# Patient Record
Sex: MALE | Race: BLACK OR AFRICAN AMERICAN | NOT HISPANIC OR LATINO | URBAN - METROPOLITAN AREA
[De-identification: names, ages, dates, MRNs, and addresses within clinical notes are randomized per-mention and may not be internally consistent; named-entity substitution may affect disease eponyms.]

---

## 2023-06-23 ENCOUNTER — HOSPITAL ENCOUNTER (EMERGENCY)
Facility: HOSPITAL | Age: 48
Discharge: HOME | End: 2023-06-23
Attending: EMERGENCY MEDICINE

## 2023-06-23 VITALS
TEMPERATURE: 98 F | OXYGEN SATURATION: 99 % | BODY MASS INDEX: 26.79 KG/M2 | WEIGHT: 220 LBS | RESPIRATION RATE: 18 BRPM | HEIGHT: 76 IN | HEART RATE: 74 BPM | SYSTOLIC BLOOD PRESSURE: 151 MMHG | DIASTOLIC BLOOD PRESSURE: 90 MMHG

## 2023-06-23 DIAGNOSIS — K08.89 PAIN, DENTAL: Primary | ICD-10-CM

## 2023-06-23 PROCEDURE — 99284 EMERGENCY DEPT VISIT MOD MDM: CPT

## 2023-06-23 RX ORDER — HYDROCODONE BITARTRATE AND ACETAMINOPHEN 5; 325 MG/1; MG/1
1 TABLET ORAL EVERY 6 HOURS PRN
Qty: 12 TABLET | Refills: 0 | Status: SHIPPED | OUTPATIENT
Start: 2023-06-23

## 2023-06-23 RX ORDER — AMOXICILLIN AND CLAVULANATE POTASSIUM 875; 125 MG/1; MG/1
1 TABLET, FILM COATED ORAL 2 TIMES DAILY
Qty: 20 TABLET | Refills: 0 | Status: SHIPPED | OUTPATIENT
Start: 2023-06-23 | End: 2023-07-03

## 2023-06-23 NOTE — ED PROVIDER NOTES
"Encounter Date: 6/23/2023       History     Chief Complaint   Patient presents with    Dental Pain     C/o right lower dental pain x2 days.      Patient states right lower dental pain starting last night. Denies any fever or drainage. States that his pain is in the area of a "cracked tooth."     The history is provided by the patient. No  was used.   Dental Pain  The primary symptoms include mouth pain. Primary symptoms do not include dental injury, oral bleeding, oral lesions, headaches, fever, sore throat or angioedema. The symptoms began yesterday. The symptoms are unchanged. The symptoms occur intermittently.   Mouth pain began 24 -48 hours ago. Mouth pain occurs intermittently. Mouth pain is unchanged. Affected locations include: teeth. At its highest the mouth pain was at 10/10. The mouth pain is currently at 10/10.   Additional symptoms do not include: dental sensitivity to temperature, purulent gums, trismus, jaw pain, facial swelling, trouble swallowing and pain with swallowing.   Review of patient's allergies indicates:  No Known Allergies  Past Medical History:   Diagnosis Date    Pain and swelling of left lower leg     Sciatica     Tobacco abuse      No past surgical history on file.  No family history on file.     Review of Systems   Constitutional: Negative.  Negative for chills and fever.   HENT:  Positive for dental problem. Negative for facial swelling, sore throat and trouble swallowing.    Eyes: Negative.    Respiratory: Negative.     Cardiovascular: Negative.    Gastrointestinal: Negative.    Endocrine: Negative.    Genitourinary: Negative.    Musculoskeletal: Negative.    Skin: Negative.    Allergic/Immunologic: Negative.    Neurological: Negative.  Negative for headaches.   Hematological: Negative.    Psychiatric/Behavioral: Negative.     All other systems reviewed and are negative.    Physical Exam     Initial Vitals [06/23/23 1107]   BP Pulse Resp Temp SpO2   (!) 151/90 74 " "18 98.4 °F (36.9 °C) 99 %      MAP       --         Physical Exam    Nursing note and vitals reviewed.  Constitutional: He appears well-developed and well-nourished. No distress.   HENT:   Head: Normocephalic and atraumatic.   Mouth/Throat: Uvula is midline, oropharynx is clear and moist and mucous membranes are normal. No trismus in the jaw. Abnormal dentition (Right lower molar appears broken with mild erythema at the gumline. There is no abscess or flunctuance noted.). No dental abscesses.       Eyes: Conjunctivae and EOM are normal. Pupils are equal, round, and reactive to light.   Neck: Neck supple.   Normal range of motion.  Cardiovascular:  Normal rate, regular rhythm, normal heart sounds and intact distal pulses.           Pulmonary/Chest: Breath sounds normal. No respiratory distress.   Musculoskeletal:         General: No edema. Normal range of motion.      Cervical back: Normal range of motion and neck supple.     Lymphadenopathy:     He has no cervical adenopathy.   Neurological: He is alert and oriented to person, place, and time. He has normal strength.   Skin: Skin is warm and dry. No rash noted.   Psychiatric: He has a normal mood and affect. Thought content normal.       ED Course   Procedures  Labs Reviewed - No data to display       Imaging Results    None          Medications - No data to display  Medical Decision Making:   Initial Assessment:   Patient states right lower dental pain starting last night. Denies any fever or drainage. States that his pain is in the area of a "cracked tooth."     The history is provided by the patient. No  was used.   Dental Pain  The primary symptoms include mouth pain. Primary symptoms do not include dental injury, oral bleeding, oral lesions, headaches, fever, sore throat or angioedema. The symptoms began yesterday. The symptoms are unchanged. The symptoms occur intermittently.   Mouth pain began 24 -48 hours ago. Mouth pain occurs " intermittently. Mouth pain is unchanged. Affected locations include: teeth. At its highest the mouth pain was at 10/10. The mouth pain is currently at 10/10.   Additional symptoms do not include: dental sensitivity to temperature, purulent gums, trismus, jaw pain, facial swelling, trouble swallowing and pain with swallowing.   Patient is awake,  alert, afebrile, and nontoxic appearing in the ED.  Differential Diagnosis:   Dental Pain, Dental Abscess  ED Management:  Will discharge with PO antibiotics and pain control. Instructed to follow-up with Dentist.                         Clinical Impression:   Final diagnoses:  [K08.89] Pain, dental (Primary)        ED Disposition Condition    Discharge Stable          ED Prescriptions       Medication Sig Dispense Start Date End Date Auth. Provider    HYDROcodone-acetaminophen (NORCO) 5-325 mg per tablet Take 1 tablet by mouth every 6 (six) hours as needed for Pain. 12 tablet 6/23/2023 -- CHEYANNE Gamboa    amoxicillin-clavulanate 875-125mg (AUGMENTIN) 875-125 mg per tablet Take 1 tablet by mouth 2 (two) times daily. for 10 days 20 tablet 6/23/2023 7/3/2023 CHEYANNE Gamboa          Follow-up Information       Follow up With Specialties Details Why Contact Info    Primary Care Provider  In 3 days      Dentist  In 3 days               CHEYANNE Gamboa  06/23/23 4098

## 2023-08-23 ENCOUNTER — HOSPITAL ENCOUNTER (EMERGENCY)
Facility: HOSPITAL | Age: 48
Discharge: HOME | End: 2023-08-23
Attending: INTERNAL MEDICINE

## 2023-08-23 VITALS
WEIGHT: 220 LBS | RESPIRATION RATE: 18 BRPM | OXYGEN SATURATION: 100 % | DIASTOLIC BLOOD PRESSURE: 73 MMHG | HEART RATE: 78 BPM | BODY MASS INDEX: 26.78 KG/M2 | TEMPERATURE: 99 F | SYSTOLIC BLOOD PRESSURE: 128 MMHG

## 2023-08-23 DIAGNOSIS — M54.9 ACUTE LEFT-SIDED BACK PAIN, UNSPECIFIED BACK LOCATION: Primary | ICD-10-CM

## 2023-08-23 PROCEDURE — 99284 EMERGENCY DEPT VISIT MOD MDM: CPT

## 2023-08-23 RX ORDER — BACLOFEN 10 MG/1
10 TABLET ORAL 3 TIMES DAILY
Qty: 30 TABLET | Refills: 0 | Status: SHIPPED | OUTPATIENT
Start: 2023-08-23 | End: 2023-09-02

## 2023-08-23 RX ORDER — LIDOCAINE 50 MG/G
1 PATCH TOPICAL DAILY
Qty: 12 PATCH | Refills: 0 | Status: SHIPPED | OUTPATIENT
Start: 2023-08-23 | End: 2023-09-04

## 2023-08-23 RX ORDER — METHYLPREDNISOLONE 4 MG/1
TABLET ORAL
Qty: 1 EACH | Refills: 0 | Status: SHIPPED | OUTPATIENT
Start: 2023-08-23

## 2023-08-23 RX ORDER — DICLOFENAC SODIUM 75 MG/1
75 TABLET, DELAYED RELEASE ORAL 2 TIMES DAILY
Qty: 20 TABLET | Refills: 0 | Status: SHIPPED | OUTPATIENT
Start: 2023-08-23

## 2023-08-23 NOTE — Clinical Note
"Manny Orellanaflor Mariee was seen and treated in our emergency department on 8/23/2023.  He may return to work on 08/25/2023.       If you have any questions or concerns, please don't hesitate to call.      Carlitos Chino PA"

## 2023-08-23 NOTE — ED PROVIDER NOTES
"Encounter Date: 8/23/2023       History     Chief Complaint   Patient presents with    Back Pain     Pt reports having back pain, "feels like my sciatica", x5 days, no trauma.      49 YO AAM in ER with complaints of left sided mid/lower back pain X 5 days. No injury/trauma. No bladder/bowel dysfunction. Denies fever, chills, chest pain, SOB, abdominal pain, N/V/D, HA or dizziness. No other complaints. Hx of same in past.     The history is provided by the patient.     Review of patient's allergies indicates:  No Known Allergies  Past Medical History:   Diagnosis Date    Pain and swelling of left lower leg     Sciatica     Tobacco abuse      No past surgical history on file.  No family history on file.     Review of Systems   Constitutional:  Negative for chills and fever.   HENT:  Negative for congestion and trouble swallowing.    Respiratory:  Negative for shortness of breath and wheezing.    Cardiovascular:  Negative for chest pain and leg swelling.   Gastrointestinal:  Negative for abdominal pain, diarrhea, nausea and vomiting.   Musculoskeletal:  Positive for back pain and myalgias. Negative for joint swelling.   Skin:  Negative for rash.   Neurological:  Negative for dizziness, weakness and headaches.   All other systems reviewed and are negative.      Physical Exam     Initial Vitals [08/23/23 0844]   BP Pulse Resp Temp SpO2   128/73 78 18 98.5 °F (36.9 °C) 100 %      MAP       --         Physical Exam    Nursing note and vitals reviewed.  Constitutional: He appears well-developed and well-nourished.   HENT:   Head: Normocephalic and atraumatic.   Nose: Nose normal.   Eyes: Conjunctivae are normal.   Neck: Neck supple.   Normal range of motion.  Cardiovascular:  Normal rate, regular rhythm and intact distal pulses.           Pulmonary/Chest: Breath sounds normal.   Musculoskeletal:      Cervical back: Normal range of motion and neck supple.      Lumbar back: Spasms and tenderness present. No swelling. " Decreased range of motion.        Back:      Neurological: He is alert and oriented to person, place, and time.   Skin: Skin is dry.   Psychiatric: He has a normal mood and affect.         ED Course   Procedures  Labs Reviewed - No data to display       Imaging Results    None          Medications - No data to display  Medical Decision Making  Risk  Prescription drug management.                               Clinical Impression:   Final diagnoses:  [M54.9] Acute left-sided back pain, unspecified back location (Primary)        ED Disposition Condition    Discharge Stable          ED Prescriptions       Medication Sig Dispense Start Date End Date Auth. Provider    diclofenac (VOLTAREN) 75 MG EC tablet Take 1 tablet (75 mg total) by mouth 2 (two) times daily. Do not take with Aleeve or any other NSAIDs (Advil, Motrin, ibuprofen etc...) 20 tablet 8/23/2023 -- Carlitos Chino PA    methylPREDNISolone (MEDROL DOSEPACK) 4 mg tablet Take as package instructs 1 each 8/23/2023 -- Carlitos Chino PA    LIDOcaine (LIDODERM) 5 % Place 1 patch onto the skin once daily. Remove & Discard patch within 12 hours or as directed by MD for 12 doses 12 patch 8/23/2023 9/4/2023 Carlitos Chino PA    baclofen (LIORESAL) 10 MG tablet Take 1 tablet (10 mg total) by mouth 3 (three) times daily. for 10 days 30 tablet 8/23/2023 9/2/2023 Carlitos Chino PA          Follow-up Information       Follow up With Specialties Details Why Contact Info    [facility] - Emergency Dept Emergency Medicine In 3 days As needed, If symptoms worsen [address] [facility] [id number] [phone]    Primary Care Provider  Schedule an appointment as soon as possible for a visit in 5 days  Call a PCP to make an appointment for follow up within 3-5  days.             Carlitos Chino PA  08/23/23 2892

## 2023-08-23 NOTE — DISCHARGE INSTRUCTIONS
Take all medications as prescribed.     Drink plenty of fluids and get a lot of rest.     Return to ER for any changes or worsening of symptoms.    [No Acute Distress] : no acute distress [Well Nourished] : well nourished [Well Developed] : well developed [Well-Appearing] : well-appearing [Normal Sclera/Conjunctiva] : normal sclera/conjunctiva [PERRL] : pupils equal round and reactive to light [EOMI] : extraocular movements intact [Normal Outer Ear/Nose] : the outer ears and nose were normal in appearance [Normal Oropharynx] : the oropharynx was normal [No JVD] : no jugular venous distention [No Lymphadenopathy] : no lymphadenopathy [Supple] : supple [Thyroid Normal, No Nodules] : the thyroid was normal and there were no nodules present [No Respiratory Distress] : no respiratory distress  [No Accessory Muscle Use] : no accessory muscle use [Clear to Auscultation] : lungs were clear to auscultation bilaterally [Normal Rate] : normal rate  [Regular Rhythm] : with a regular rhythm [Normal S1, S2] : normal S1 and S2 [No Murmur] : no murmur heard [No Carotid Bruits] : no carotid bruits [No Abdominal Bruit] : a ~M bruit was not heard ~T in the abdomen [No Varicosities] : no varicosities [Pedal Pulses Present] : the pedal pulses are present [No Edema] : there was no peripheral edema [No Palpable Aorta] : no palpable aorta [No Extremity Clubbing/Cyanosis] : no extremity clubbing/cyanosis [Soft] : abdomen soft [Non Tender] : non-tender [Non-distended] : non-distended [No Masses] : no abdominal mass palpated [Normal Bowel Sounds] : normal bowel sounds [No HSM] : no HSM [Normal Posterior Cervical Nodes] : no posterior cervical lymphadenopathy [Normal Anterior Cervical Nodes] : no anterior cervical lymphadenopathy [No CVA Tenderness] : no CVA  tenderness [No Spinal Tenderness] : no spinal tenderness [No Joint Swelling] : no joint swelling [Grossly Normal Strength/Tone] : grossly normal strength/tone [No Rash] : no rash [Coordination Grossly Intact] : coordination grossly intact [No Focal Deficits] : no focal deficits [Normal Gait] : normal gait [Deep Tendon Reflexes (DTR)] : deep tendon reflexes were 2+ and symmetric [Normal Affect] : the affect was normal [Normal Insight/Judgement] : insight and judgment were intact [de-identified] : obese